# Patient Record
Sex: FEMALE | Race: WHITE | URBAN - METROPOLITAN AREA
[De-identification: names, ages, dates, MRNs, and addresses within clinical notes are randomized per-mention and may not be internally consistent; named-entity substitution may affect disease eponyms.]

---

## 2023-04-07 ENCOUNTER — OFFICE VISIT (OUTPATIENT)
Dept: URBAN - METROPOLITAN AREA CLINIC 13 | Facility: CLINIC | Age: 18
End: 2023-04-07
Payer: COMMERCIAL

## 2023-04-07 DIAGNOSIS — H53.10 UNSPECIFIED SUBJECTIVE VISUAL DISTURBANCES: Primary | ICD-10-CM

## 2023-04-07 PROCEDURE — 99203 OFFICE O/P NEW LOW 30 MIN: CPT | Performed by: OPHTHALMOLOGY

## 2023-04-07 PROCEDURE — 92134 CPTRZ OPH DX IMG PST SGM RTA: CPT | Performed by: OPHTHALMOLOGY

## 2023-04-07 ASSESSMENT — INTRAOCULAR PRESSURE
OD: 21
OS: 21

## 2023-04-07 NOTE — IMPRESSION/PLAN
Impression: Unspecified subjective visual disturbances: H53.10. Plan: She complains of blurry vision OU x 1 day. Exam is unremarkable OU with good foveal reflex OU. There is no intraocular inflammation and she denies significant PMX. However, OCT today shows disruption in the outer retina/RPE that is just subfoveal OD>OS. We discussed checking a FA but she is leaving to go back to [place] tomorrow and thus, I recommend observation today and she will f/u with a retina specialist back home. I am happy to see her again anytime. RTC PRN
thanks Dr. Alena Navarrete Prior notes from other provider(s) have been reviewed in conjunction with today's visit.